# Patient Record
Sex: MALE | Race: WHITE | Employment: FULL TIME | URBAN - METROPOLITAN AREA
[De-identification: names, ages, dates, MRNs, and addresses within clinical notes are randomized per-mention and may not be internally consistent; named-entity substitution may affect disease eponyms.]

---

## 2023-02-12 ENCOUNTER — HOSPITAL ENCOUNTER (EMERGENCY)
Age: 53
Discharge: HOME | End: 2023-02-12
Attending: EMERGENCY MEDICINE
Payer: COMMERCIAL

## 2023-02-12 ENCOUNTER — APPOINTMENT (OUTPATIENT)
Dept: CT IMAGING | Age: 53
End: 2023-02-12
Payer: COMMERCIAL

## 2023-02-12 VITALS
BODY MASS INDEX: 29.12 KG/M2 | SYSTOLIC BLOOD PRESSURE: 129 MMHG | DIASTOLIC BLOOD PRESSURE: 78 MMHG | OXYGEN SATURATION: 98 % | WEIGHT: 215 LBS | RESPIRATION RATE: 22 BRPM | HEIGHT: 72 IN | TEMPERATURE: 98.1 F | HEART RATE: 111 BPM

## 2023-02-12 DIAGNOSIS — U07.1 COVID-19: Primary | ICD-10-CM

## 2023-02-12 LAB
A/G RATIO: 1.3 (ref 1.1–2.2)
ALBUMIN SERPL-MCNC: 4 G/DL (ref 3.4–5)
ALP BLD-CCNC: 102 U/L (ref 40–129)
ALT SERPL-CCNC: 32 U/L (ref 10–40)
ANION GAP SERPL CALCULATED.3IONS-SCNC: 11 MMOL/L (ref 3–16)
AST SERPL-CCNC: 30 U/L (ref 15–37)
BACTERIA: ABNORMAL /HPF
BASOPHILS ABSOLUTE: 0 K/UL (ref 0–0.2)
BASOPHILS RELATIVE PERCENT: 0.3 %
BILIRUB SERPL-MCNC: 1 MG/DL (ref 0–1)
BILIRUBIN URINE: NEGATIVE
BLOOD, URINE: ABNORMAL
BUN BLDV-MCNC: 13 MG/DL (ref 7–20)
CALCIUM SERPL-MCNC: 9.3 MG/DL (ref 8.3–10.6)
CHLORIDE BLD-SCNC: 103 MMOL/L (ref 99–110)
CLARITY: CLEAR
CO2: 25 MMOL/L (ref 21–32)
COLOR: YELLOW
CREAT SERPL-MCNC: 0.8 MG/DL (ref 0.9–1.3)
EOSINOPHILS ABSOLUTE: 0.1 K/UL (ref 0–0.6)
EOSINOPHILS RELATIVE PERCENT: 1.3 %
EPITHELIAL CELLS, UA: 1 /HPF (ref 0–5)
GFR SERPL CREATININE-BSD FRML MDRD: >60 ML/MIN/{1.73_M2}
GLUCOSE BLD-MCNC: 112 MG/DL (ref 70–99)
GLUCOSE URINE: NEGATIVE MG/DL
HCT VFR BLD CALC: 45.1 % (ref 40.5–52.5)
HEMOGLOBIN: 15.3 G/DL (ref 13.5–17.5)
HYALINE CASTS: 2 /LPF (ref 0–8)
KETONES, URINE: NEGATIVE MG/DL
LACTIC ACID, SEPSIS: 1.1 MMOL/L (ref 0.4–1.9)
LEUKOCYTE ESTERASE, URINE: ABNORMAL
LYMPHOCYTES ABSOLUTE: 1 K/UL (ref 1–5.1)
LYMPHOCYTES RELATIVE PERCENT: 11.4 %
MCH RBC QN AUTO: 31.1 PG (ref 26–34)
MCHC RBC AUTO-ENTMCNC: 33.8 G/DL (ref 31–36)
MCV RBC AUTO: 91.8 FL (ref 80–100)
MICROSCOPIC EXAMINATION: YES
MONOCYTES ABSOLUTE: 0.9 K/UL (ref 0–1.3)
MONOCYTES RELATIVE PERCENT: 9.9 %
NEUTROPHILS ABSOLUTE: 6.8 K/UL (ref 1.7–7.7)
NEUTROPHILS RELATIVE PERCENT: 77.1 %
NITRITE, URINE: NEGATIVE
PDW BLD-RTO: 13.1 % (ref 12.4–15.4)
PH UA: 7 (ref 5–8)
PLATELET # BLD: 162 K/UL (ref 135–450)
PMV BLD AUTO: 7.4 FL (ref 5–10.5)
POTASSIUM SERPL-SCNC: 3.9 MMOL/L (ref 3.5–5.1)
PROCALCITONIN: 0.08 NG/ML (ref 0–0.15)
PROTEIN UA: 100 MG/DL
RAPID INFLUENZA  B AGN: NEGATIVE
RAPID INFLUENZA A AGN: NEGATIVE
RBC # BLD: 4.91 M/UL (ref 4.2–5.9)
RBC UA: 222 /HPF (ref 0–4)
SARS-COV-2, NAAT: DETECTED
SODIUM BLD-SCNC: 139 MMOL/L (ref 136–145)
SPECIFIC GRAVITY UA: 1.01 (ref 1–1.03)
TOTAL PROTEIN: 7.1 G/DL (ref 6.4–8.2)
URINE REFLEX TO CULTURE: YES
URINE TYPE: ABNORMAL
UROBILINOGEN, URINE: 0.2 E.U./DL
WBC # BLD: 8.9 K/UL (ref 4–11)
WBC UA: 46 /HPF (ref 0–5)

## 2023-02-12 PROCEDURE — 80053 COMPREHEN METABOLIC PANEL: CPT

## 2023-02-12 PROCEDURE — 99284 EMERGENCY DEPT VISIT MOD MDM: CPT

## 2023-02-12 PROCEDURE — 87086 URINE CULTURE/COLONY COUNT: CPT

## 2023-02-12 PROCEDURE — 6370000000 HC RX 637 (ALT 250 FOR IP): Performed by: EMERGENCY MEDICINE

## 2023-02-12 PROCEDURE — 87635 SARS-COV-2 COVID-19 AMP PRB: CPT

## 2023-02-12 PROCEDURE — 83605 ASSAY OF LACTIC ACID: CPT

## 2023-02-12 PROCEDURE — 84145 PROCALCITONIN (PCT): CPT

## 2023-02-12 PROCEDURE — 87186 SC STD MICRODIL/AGAR DIL: CPT

## 2023-02-12 PROCEDURE — 2580000003 HC RX 258: Performed by: PHYSICIAN ASSISTANT

## 2023-02-12 PROCEDURE — 85025 COMPLETE CBC W/AUTO DIFF WBC: CPT

## 2023-02-12 PROCEDURE — 96375 TX/PRO/DX INJ NEW DRUG ADDON: CPT

## 2023-02-12 PROCEDURE — 74176 CT ABD & PELVIS W/O CONTRAST: CPT

## 2023-02-12 PROCEDURE — 96365 THER/PROPH/DIAG IV INF INIT: CPT

## 2023-02-12 PROCEDURE — 87804 INFLUENZA ASSAY W/OPTIC: CPT

## 2023-02-12 PROCEDURE — 96361 HYDRATE IV INFUSION ADD-ON: CPT

## 2023-02-12 PROCEDURE — 87040 BLOOD CULTURE FOR BACTERIA: CPT

## 2023-02-12 PROCEDURE — 6360000002 HC RX W HCPCS: Performed by: PHYSICIAN ASSISTANT

## 2023-02-12 PROCEDURE — 81001 URINALYSIS AUTO W/SCOPE: CPT

## 2023-02-12 PROCEDURE — 87077 CULTURE AEROBIC IDENTIFY: CPT

## 2023-02-12 RX ORDER — CEPHALEXIN 500 MG/1
500 CAPSULE ORAL 2 TIMES DAILY
Qty: 14 CAPSULE | Refills: 0 | Status: SHIPPED | OUTPATIENT
Start: 2023-02-12 | End: 2023-02-19

## 2023-02-12 RX ORDER — KETOROLAC TROMETHAMINE 15 MG/ML
30 INJECTION, SOLUTION INTRAMUSCULAR; INTRAVENOUS ONCE
Status: COMPLETED | OUTPATIENT
Start: 2023-02-12 | End: 2023-02-12

## 2023-02-12 RX ORDER — ACETAMINOPHEN 500 MG
1000 TABLET ORAL ONCE
Status: COMPLETED | OUTPATIENT
Start: 2023-02-12 | End: 2023-02-12

## 2023-02-12 RX ORDER — KETOROLAC TROMETHAMINE 30 MG/ML
30 INJECTION, SOLUTION INTRAMUSCULAR; INTRAVENOUS ONCE
Status: DISCONTINUED | OUTPATIENT
Start: 2023-02-12 | End: 2023-02-12

## 2023-02-12 RX ORDER — 0.9 % SODIUM CHLORIDE 0.9 %
2000 INTRAVENOUS SOLUTION INTRAVENOUS ONCE
Status: COMPLETED | OUTPATIENT
Start: 2023-02-12 | End: 2023-02-12

## 2023-02-12 RX ADMIN — KETOROLAC TROMETHAMINE 30 MG: 15 INJECTION, SOLUTION INTRAMUSCULAR; INTRAVENOUS at 13:08

## 2023-02-12 RX ADMIN — SODIUM CHLORIDE 2000 ML: 9 INJECTION, SOLUTION INTRAVENOUS at 12:57

## 2023-02-12 RX ADMIN — ACETAMINOPHEN 1000 MG: 500 TABLET ORAL at 14:23

## 2023-02-12 RX ADMIN — CEFEPIME 2000 MG: 2 INJECTION, POWDER, FOR SOLUTION INTRAVENOUS at 12:59

## 2023-02-12 ASSESSMENT — PAIN SCALES - GENERAL
PAINLEVEL_OUTOF10: 1
PAINLEVEL_OUTOF10: 1

## 2023-02-12 ASSESSMENT — ENCOUNTER SYMPTOMS
DIARRHEA: 0
COUGH: 0
SHORTNESS OF BREATH: 0
VOMITING: 0
WHEEZING: 0
ABDOMINAL PAIN: 1
RHINORRHEA: 0
NAUSEA: 1

## 2023-02-12 ASSESSMENT — PAIN - FUNCTIONAL ASSESSMENT
PAIN_FUNCTIONAL_ASSESSMENT: 0-10
PAIN_FUNCTIONAL_ASSESSMENT: 0-10

## 2023-02-12 ASSESSMENT — PAIN DESCRIPTION - ORIENTATION: ORIENTATION: RIGHT

## 2023-02-12 NOTE — ED NOTES
Pt calm and resting quietly with equal rise and fall of chest. Pt in NAD, RR even and unlabored. Side rails up, bed locked and in lowest position. Pt updated on plan of care. No needs at this time. Pt instructed on use of call light if needed.       Iron Yun RN  02/12/23 9198

## 2023-02-12 NOTE — ED NOTES
Discharge paperwork given to and reviewed with pt. Pt verbalized understanding and all questions answered. Pt encouraged to return if having worsening symptoms or new symptoms discussed in discharge paperwork. Pt to follow up with Urology  Rx x 1 given and medications reviewed with pt. IV removed with catheter intact. Pt in NAD, RR even and unlabored.  Pt off unit ambulatory with partner     Perla Pepe RN  02/12/23 2368

## 2023-02-12 NOTE — ED NOTES
Pt placed on CMU, pulse ox, and NIBP. Pt given call light and instructed on its use. No new needs at this time. Will cont to monitor.       Osmany Thomas RN  02/12/23 1257

## 2023-02-12 NOTE — ED PROVIDER NOTES
[address] ENCOUNTER        Pt Name: Camron Renee  MRN: [id number]  [place] 1970  Date of evaluation: 2/12/2023  Provider: Asa Wilson PA-C  PCP: Paul Perkins  Note Started: 12:22 PM EST 2/12/23       I have seen and evaluated this patient with my supervising physician Steffanie Call, [id number]  [facility]       Chief Complaint   Patient presents with    Fever     Patient had a urinary stent placed recently and has since developed a fever. HISTORY OF PRESENT ILLNESS: 1 or more Elements     History From: patient  Limitations to history : None    Camron Renee is a 48 y.o. male who presents for evaluation of fevers and chills that started 2 days ago. Patient states that he had had kidney stones and had a stent placed by the urology group 5 days ago. Started with some lower abdominal pain and nausea today. He intermittently has flank pain associated with indwelling stones. He does not have a Shea catheter in place. He denies any cough congestion runny nose. No chest pain or shortness of breath. No vomiting or diarrhea. He has no other complaints or concerns at this time. Nursing Notes were all reviewed and agreed with or any disagreements were addressed in the HPI. REVIEW OF SYSTEMS :      Review of Systems   Constitutional:  Positive for fever. Negative for appetite change and chills. HENT:  Negative for congestion and rhinorrhea. Respiratory:  Negative for cough, shortness of breath and wheezing. Cardiovascular:  Negative for chest pain. Gastrointestinal:  Positive for abdominal pain and nausea. Negative for diarrhea and vomiting. Genitourinary:  Negative for difficulty urinating, dysuria and hematuria. Musculoskeletal:  Negative for neck pain and neck stiffness. Skin:  Negative for rash. Neurological:  Negative for headaches. Positives and Pertinent negatives as per HPI.      SURGICAL HISTORY History reviewed. No pertinent surgical history. CURRENTMEDICATIONS       Previous Medications    No medications on file       ALLERGIES     Compazine [prochlorperazine] and Iodine    FAMILYHISTORY     History reviewed. No pertinent family history. SOCIAL HISTORY       Social History     Tobacco Use    Smoking status: Never     Passive exposure: Never    Smokeless tobacco: Never   Substance Use Topics    Alcohol use: Not Currently    Drug use: Never       SCREENINGS        Marita Coma Scale  Eye Opening: Spontaneous  Best Verbal Response: Oriented  Best Motor Response: Obeys commands  [place] Coma Scale Score: 15                CIWA Assessment  BP: 129/78  Heart Rate: (!) 111           PHYSICAL EXAM  1 or more Elements     ED Triage Vitals   BP Temp Temp src Pulse Resp SpO2 Height Weight   -- -- -- -- -- -- -- --       Physical Exam  Vitals and nursing note reviewed. Constitutional:       General: He is not in acute distress. Appearance: He is well-developed. He is ill-appearing. He is not toxic-appearing or diaphoretic. HENT:      Head: Normocephalic and atraumatic. Right Ear: External ear normal.      Left Ear: External ear normal.      Nose: Nose normal.   Eyes:      General:         Right eye: No discharge. Left eye: No discharge. Cardiovascular:      Rate and Rhythm: Regular rhythm. Tachycardia present. Heart sounds: Normal heart sounds. Pulmonary:      Effort: Pulmonary effort is normal. No respiratory distress. Breath sounds: Normal breath sounds. Chest:      Chest wall: No tenderness. Abdominal:      General: Bowel sounds are normal. There is no distension. Palpations: Abdomen is soft. Tenderness: There is no abdominal tenderness. There is no right CVA tenderness, left CVA tenderness, guarding or rebound. Musculoskeletal:         General: Normal range of motion. Cervical back: Normal range of motion and neck supple.    Skin:     General: Skin is warm and dry. Neurological:      Mental Status: He is alert and oriented to person, place, and time. Psychiatric:         Behavior: Behavior normal.         DIAGNOSTIC RESULTS   LABS:    Labs Reviewed   COVID-19, RAPID - Abnormal; Notable for the following components:       Result Value    SARS-CoV-2, NAAT DETECTED (*)     All other components within normal limits   COMPREHENSIVE METABOLIC PANEL - Abnormal; Notable for the following components:    Glucose 112 (*)     Creatinine 0.8 (*)     All other components within normal limits   URINALYSIS WITH REFLEX TO CULTURE - Abnormal; Notable for the following components:    Blood, Urine LARGE (*)     Protein,  (*)     Leukocyte Esterase, Urine LARGE (*)     All other components within normal limits   MICROSCOPIC URINALYSIS - Abnormal; Notable for the following components:    WBC, UA 46 (*)     RBC,  (*)     All other components within normal limits   RAPID INFLUENZA A/B ANTIGENS   CULTURE, BLOOD 1   CULTURE, BLOOD 2   CULTURE, URINE   CBC WITH AUTO DIFFERENTIAL   LACTATE, SEPSIS   PROCALCITONIN       When ordered only abnormal lab results are displayed. All other labs were within normal range or not returned as of this dictation. EKG: When ordered, EKG's are interpreted by the Emergency Department Physician in the absence of a cardiologist.  Please see their note for interpretation of EKG. RADIOLOGY:   Non-plain film images such as CT, Ultrasound and MRI are read by the radiologist. Plain radiographic images are visualized and preliminarily interpreted by the ED Provider with the below findings:      Interpretation per the Radiologist below, if available at the time of this note:    CT ABDOMEN PELVIS WO CONTRAST Additional Contrast? None   Final Result      1. Right double-J ureteral stent proper position without any definitive   evidence of hydronephrosis or stent malfunction. 2.  Proximal and mid periureteral edema.   This could be related to a recent   procedure or residual from a prior urinary tract obstruction that required   placement of the ureteral stent. It also can be a sign of underlying urinary   tract infection, correlate clinically. 3.  Somewhat diminished density of the liver which can be a sign of fatty   infiltration without focal liver abnormality noted given the lack of   intravenous contrast.      4.  Otherwise no acute pathology noted. No results found. No results found. PROCEDURES   Unless otherwise noted below, none     Procedures    CRITICAL CARE TIME (.cctime)   There was a high probability of life-threatening clinical deterioration in the patient's condition requiring my urgent intervention. I personally saw the patient and independently provided 31 minutes of non-concurrent critical care out of the total shared critical care time provided, excluding separately reportable procedures. PAST MEDICAL HISTORY      has no past medical history on file. EMERGENCY DEPARTMENT COURSE and DIFFERENTIAL DIAGNOSIS/MDM:   Vitals:    Vitals:    02/12/23 1359 02/12/23 1400 02/12/23 1430 02/12/23 1500   BP:  126/62 126/79 129/78   Pulse:  (!) 104 (!) 102 (!) 111   Resp:  14 18 22   Temp: 98.1 °F (36.7 °C)      TempSrc: Oral      SpO2:  96% 97% 98%   Weight:       Height:           Patient was given the following medications:  Medications   cefepime (MAXIPIME) 2,000 mg in sodium chloride 0.9 % 50 mL IVPB (mini-bag) (0 mg IntraVENous Stopped 2/12/23 1329)   0.9 % sodium chloride bolus (0 mLs IntraVENous Stopped 2/12/23 1506)   ketorolac (TORADOL) injection 30 mg (30 mg IntraVENous Given 2/12/23 1308)   acetaminophen (TYLENOL) tablet 1,000 mg (1,000 mg Oral Given 2/12/23 1423)             Is this patient to be included in the SEP-1 Core Measure due to severe sepsis or septic shock?    No   Exclusion criteria - the patient is NOT to be included for SEP-1 Core Measure due to:  May have criteria for sepsis, but does not meet criteria for severe sepsis or septic shock    Chronic Conditions affecting care:    has no past medical history on file. CONSULTS: (Who and What was discussed)  IP CONSULT TO UROLOGY      Social Determinants Significantly Affecting Health : None    Records Reviewed (External and Source) not available     CC/HPI Summary, DDx, ED Course, and Reassessment: Patient presents for evaluation of fevers and chills that started 2 days ago. On exam, he looks he does not feel well but is in no acute distress and nontoxic. He is tachycardic and febrile up to 103.2. Vitals otherwise stable. Lungs are clear to auscultation bilaterally. Abdomen is soft, nontender with no peritoneal signs. No CVA tenderness. Patient had taken Tylenol 1 hour prior to arrival.  Given IV fluids, Toradol and empiric antibiotic therapy and will be reevaluated. Disposition Considerations (tests considered but not done, Admit vs D/C, Shared Decision Making, Pt Expectation of Test or Tx.): CBC and CMP are unremarkable. No leukocytosis. Lactate and Pro-Darrel are within normal limits. Urinalysis positive for large blood, large leukocytes with 46 white blood cells and 222 red blood cells. CT abdomen and pelvis shows stent in proper position with no evidence of hydronephrosis or stent malfunction. There is proximal and mid periureteral edema likely related to recent procedure or residual from prior obstruction. Rapid flu is negative. COVID is positive. I spoke with on-call urology, Dr. Cory Tafoya, who recommends empiric antibiotics and states the patient is stable for outpatient management. He is post to follow-up on Tuesday for stent removal.  On reevaluation, he is afebrile, heart rate improved. He was given Keflex for home. Strict return precautions discussed. Patient remained very well-appearing throughout his stay in the ED. No hypoxia.   Encouraged to follow-up with PCP and/or CDC for additional testing for respiratory panel or COVID as deemed necessary by PCP. However, encouraged to self quarantine x14 days. I estimate there is LOW risk for PNEUMONIA, MENINGITIS, PERITONSILLAR ABSCESS, SEPSIS, MALIGNANT OTITIS EXTERNA, OR EPIGLOTTITIS thus I consider the discharge disposition reasonable. Based on clinical presentation, physical exam and diagnostics, the patient likely has a viral illness. I discussed symptomatic treatment, fluids, and rest. Patient is advised to follow-up with their family doctor within 24-48 hours and return to the ER if she does not improve as anticipated over the next several days, develops difficulty breathing, weakness, inability to take liquids, or has other concerns. Again, strict return precautions were discussed. He is agreeable to this plan and stable for discharge at this time. I am the Primary Clinician of Record.   FINAL IMPRESSION      1. COVID-19          DISPOSITION/PLAN     DISPOSITION Decision To Discharge 02/12/2023 03:34:55 PM      PATIENT REFERRED TO:  Georgia Brice MD  [facility]. [phone]    Schedule an appointment as soon as possible for a visit       [facility] Emergency Department  [address] [place] [phone]  Go to   If symptoms worsen    DISCHARGE MEDICATIONS:  New Prescriptions    CEPHALEXIN (KEFLEX) 500 MG CAPSULE    Take 1 capsule by mouth 2 times daily for 7 days       DISCONTINUED MEDICATIONS:  Discontinued Medications    No medications on file              (Please note that portions of this note were completed with a voice recognition program.  Efforts were made to edit the dictations but occasionally words are mis-transcribed.)    Oly Self PA-C (electronically signed)           Erica Rojas PA-C  02/12/23 8409

## 2023-02-13 NOTE — ED PROVIDER NOTES
In addition to the advanced practice provider, I personally saw [facility] and performed a substantive portion of the visit including all aspects of the medical decision making. Briefly, this is a 48 y.o. male here for fever ongoing for the past 2 days. Associated with generalized body aches and chills. No known sick contacts. Patient recent underwent ureteral stenting. He reports aching abdominal pain since that time which is without significant change today. No  symptoms. On exam, patient febrile however nontoxic. No distress. Heart tachycardic, regular rhythm. Lungs CTAB. Abdomen soft, nondistended, nontender to palpation in all quadrants. Screenings   [place] Coma Scale  Eye Opening: Spontaneous  Best Verbal Response: Oriented  Best Motor Response: Obeys commands  Marita Coma Scale Score: 15            MDM    Patient febrile however nontoxic. He is in no distress. No hypoxia or increased work of breathing on room air. Abdomen is benign to palpation on my exam, lower suspicion for intra-abdominal infection. Given recent ureteral stenting, CT imaging was pursued which showed no evidence of obstruction, perforation, intra-abdominal abscess or other acute finding. Labs work-up is reassuring with normal renal function, no critical electrolyte derangement. Procalcitonin normal, doubt occult bacterial source of infection. COVID-19 returns positive which is consistent with patient's clinical symptoms. Urinalysis equivocal for UTI, case discussed with Dr. Davy Ortiz who recommends empiric antibiotics and close outpatient follow-up. Patient's tachycardia was much improved with antipyretics and IV fluids, on my reevaluation prior to discharge his heart rate was  bpm.  Patient felt safe for discharge to self-care with very close PCP and urology follow-up. He is agreeable with plan and feels comfortable returning to home. Strict return precautions were discussed.     I Dr. Elayne Harvey am the primary clinician of record. Patient Referrals:  Romeor Almaguer MD  [facility]. [phone]    Schedule an appointment as soon as possible for a visit       [facility] Emergency Department  [facility] [address]  [phone]  Go to   If symptoms worsen      Discharge Medications:  Discharge Medication List as of 2/12/2023  3:44 PM        START taking these medications    Details   cephALEXin (KEFLEX) 500 MG capsule Take 1 capsule by mouth 2 times daily for 7 days, Disp-14 capsule, R-0Print             FINAL IMPRESSION  1. COVID-19        Blood pressure 129/78, pulse (!) 111, temperature 98.1 °F (36.7 °C), temperature source Oral, resp. rate 22, height 6' (1.829 m), weight 215 lb (97.5 kg), SpO2 98 %. For further details of Sofia Bang's emergency department encounter, please see documentation by advanced practice provider, Cale Mendiola, [id number].     Keturah Vazquez DO (electronically signed)  Attending Emergency Physician       Keturah Vazquez DO  02/13/23 9594

## 2023-02-14 LAB
ORGANISM: ABNORMAL
URINE CULTURE, ROUTINE: ABNORMAL

## 2023-02-15 NOTE — ED NOTES
Addendum    Patient called back on 2/16 and I explained the plan below and called in the prescription to [facility] on [facility] per patient request.     Pharmacy address: [address] [address]  Pharmacy phone number: [phone]    Thank you,    Varsha Swan, PharmD. PGY-1 Resident  [id number]  02/16/23 1:38 PM       [facility]   Emergency Department Culture Follow-Up       Joseph (CSN: [id number]) was seen and evaluated at [facility] Emergency Department on 2/12 by provider TRACEE Germain. A urine culture was positive and is growing Enterococcus faecalis. Sensitivity results: (S) to ampicillin, Macrobid, vanc. (R) to tetracycline      Treatment Course: The patient was treated and discharged with Keflex. Recommendation: It is recommend to start Macrobid 100 mg BID for 7 days. This recommendation was reviewed with and agreed by ED provider Dr. Laura Foss. Follow-Up:    The patient was unable to be contacted so a certified letter was sent to the address listed in the patient's profile.      Thank you,    Varsha wSan, [facility]  2/15/2023

## 2023-02-16 LAB
BLOOD CULTURE, ROUTINE: NORMAL
CULTURE, BLOOD 2: NORMAL